# Patient Record
Sex: MALE | Race: WHITE | Employment: OTHER | URBAN - METROPOLITAN AREA
[De-identification: names, ages, dates, MRNs, and addresses within clinical notes are randomized per-mention and may not be internally consistent; named-entity substitution may affect disease eponyms.]

---

## 2018-08-15 ENCOUNTER — PRE VISIT (OUTPATIENT)
Dept: ONCOLOGY | Facility: CLINIC | Age: 73
End: 2018-08-15

## 2018-08-15 NOTE — TELEPHONE ENCOUNTER
Date of appointment:  8/21/2018   Diagnosis/reason for appointment:  MDS  Referring provider/facility: Dr. Calvin Lopez  Who called:  Referral    Recent Studies  Imaging: None  Pathology: [facility]  Records from [facility] and [facility]      Additional information:    August 15, 2018  Faxed STAT request to [facility] for records and Bx slides

## 2018-08-20 NOTE — TELEPHONE ENCOUNTER
August 20, 2018 11:25 AM  Re-faxed STAT request to [facility] for records and slide.  Also LVM to Follow-up with status.

## 2018-08-21 ENCOUNTER — ONCOLOGY VISIT (OUTPATIENT)
Dept: ONCOLOGY | Facility: CLINIC | Age: 73
End: 2018-08-21
Attending: INTERNAL MEDICINE
Payer: COMMERCIAL

## 2018-08-21 VITALS
WEIGHT: 218.3 LBS | TEMPERATURE: 98.6 F | SYSTOLIC BLOOD PRESSURE: 131 MMHG | HEIGHT: 71 IN | HEART RATE: 64 BPM | BODY MASS INDEX: 30.56 KG/M2 | OXYGEN SATURATION: 100 % | DIASTOLIC BLOOD PRESSURE: 67 MMHG

## 2018-08-21 DIAGNOSIS — C94.6 MDS/MPN (MYELODYSPLASTIC/MYELOPROLIFERATIVE NEOPLASMS) (H): Primary | ICD-10-CM

## 2018-08-21 PROCEDURE — 99205 OFFICE O/P NEW HI 60 MIN: CPT | Mod: ZP | Performed by: INTERNAL MEDICINE

## 2018-08-21 PROCEDURE — G0463 HOSPITAL OUTPT CLINIC VISIT: HCPCS | Mod: ZF

## 2018-08-21 RX ORDER — HYDROCHLOROTHIAZIDE 25 MG/1
25 TABLET ORAL
COMMUNITY
Start: 2013-04-24

## 2018-08-21 RX ORDER — IBUPROFEN 200 MG
CAPSULE ORAL
COMMUNITY

## 2018-08-21 RX ORDER — METOPROLOL TARTRATE 25 MG/1
25 TABLET, FILM COATED ORAL
COMMUNITY
Start: 2013-04-24

## 2018-08-21 RX ORDER — ASPIRIN 325 MG
325 TABLET ORAL
COMMUNITY
Start: 2013-04-24

## 2018-08-21 RX ORDER — LISINOPRIL 20 MG/1
TABLET ORAL
COMMUNITY
Start: 2018-07-09

## 2018-08-21 RX ORDER — TRIAMCINOLONE ACETONIDE 1 MG/G
OINTMENT TOPICAL
COMMUNITY

## 2018-08-21 RX ORDER — MECLIZINE HYDROCHLORIDE 25 MG/1
25 TABLET ORAL
COMMUNITY

## 2018-08-21 RX ORDER — HYDROXYUREA 500 MG/1
1000 CAPSULE ORAL
COMMUNITY

## 2018-08-21 ASSESSMENT — PAIN SCALES - GENERAL: PAINLEVEL: NO PAIN (0)

## 2018-08-21 NOTE — MR AVS SNAPSHOT
"              After Visit Summary   2018    Tucker Nava    MRN: [id number]           Patient Information     Date Of Birth          1945        Visit Information        Provider Department      2018 1:15 PM Kenneth Robles MD [facility]        Today's Diagnoses     MDS/MPN (myelodysplastic/myeloproliferative neoplasms) (H)    -  1       Follow-ups after your visit        Who to contact     If you have questions or need follow up information about today's clinic visit or your schedule please contact [facility] directly at [phone].  Normal or non-critical lab and imaging results will be communicated to you by MyChart, letter or phone within 4 business days after the clinic has received the results. If you do not hear from us within 7 days, please contact the clinic through [employer] or phone. If you have a critical or abnormal lab result, we will notify you by phone as soon as possible.  Submit refill requests through [employer] or call your pharmacy and they will forward the refill request to us. Please allow 3 business days for your refill to be completed.          Additional Information About Your Visit        MyChart Information     [employer] lets you send messages to your doctor, view your test results, renew your prescriptions, schedule appointments and more. To sign up, go to [email or web address] . Click on \"Log in\" on the left side of the screen, which will take you to the Welcome page. Then click on \"Sign up Now\" on the right side of the page.     You will be asked to enter the access code listed below, as well as some personal information. Please follow the directions to create your username and password.     Your access code is: [email or web address]  Expires: 2018  8:53 AM     Your access code will  in 90 days. If you need help or a new code, please call your [place] clinic or [phone].        Care EveryWhere ID     This is your Care " "EveryWhere ID. This could be used by other organizations to access your [facility] medical records  [id number]        Your Vitals Were     Pulse Temperature Height Pulse Oximetry BMI (Body Mass Index)       64 98.6  F (37  C) (Oral) 1.803 m (5' 11\") 100% 30.45 kg/m2        Blood Pressure from Last 3 Encounters:   08/21/18 131/67   04/24/13 (!) 164/108    Weight from Last 3 Encounters:   08/21/18 99 kg (218 lb 4.8 oz)   04/24/13 108 kg (238 lb)              Today, you had the following     No orders found for display       Primary Care Provider Office Phone # Fax #    Patel Burns [phone]       [facility] [address] [place] [address]        Equal Access to Services     [facility]: Hadii manuel rizvi Soshady, waaxda luqadaha, qaybta kaalmada phil, william espinoza . So [facility] [phone].    ATENCIÓN: Si habla español, tiene a leal disposición servicios gratuitos de asistencia lingüística. Nanci al [phone].    We comply with applicable federal civil rights laws and [place] laws. We do not discriminate on the basis of race, color, national origin, age, disability, sex, sexual orientation, or gender identity.            Thank you!     Thank you for choosing [facility]  for your care. Our goal is always to provide you with excellent care. Hearing back from our patients is one way we can continue to improve our services. Please take a few minutes to complete the written survey that you may receive in the mail after your visit with us. Thank you!             Your Updated Medication List - Protect others around you: Learn how to safely use, store and throw away your medicines at www.disposemymeds.org.          This list is accurate as of 8/21/18  8:32 PM.  Always use your most recent med list.                   Brand Name Dispense Instructions for use Diagnosis    aspirin 325 MG tablet      Take 325 mg by mouth        " Ca Carb-FA-D-B6-B12-Boron-Mg 1342-1 MG Wafr           calcium citrate 950 MG tablet    CALCITRATE          CITRUCEL Powd   Generic drug:  methylcellulose (laxative)           FISH OIL PO      Take 1,000 mg by mouth        hydrochlorothiazide 25 MG tablet    HYDRODIURIL     Take 25 mg by mouth        hydroxyurea 500 MG capsule CHEMO    HYDREA     Take 1,000 mg by mouth        lisinopril 20 MG tablet    PRINIVIL/ZESTRIL          meclizine 25 MG tablet    ANTIVERT     Take 25 mg by mouth        metoprolol tartrate 25 MG tablet    LOPRESSOR     Take 25 mg by mouth        triamcinolone 0.1 % ointment    KENALOG          vitamin D3 1000 units Caps      Take 1,000 Units by mouth

## 2018-08-21 NOTE — NURSING NOTE
"Oncology Rooming Note    August 21, 2018 1:03 PM   Tucker Nava is a 72 year old male who presents for:    Chief Complaint   Patient presents with     Oncology Clinic Visit     [facility]     Initial Vitals: /67 (BP Location: Right arm, Patient Position: Sitting)  Pulse 64  Temp 98.6  F (37  C) (Oral)  Ht 1.803 m (5' 11\")  Wt 99 kg (218 lb 4.8 oz)  SpO2 100%  BMI 30.45 kg/m2 Estimated body mass index is 30.45 kg/(m^2) as calculated from the following:    Height as of this encounter: 1.803 m (5' 11\").    Weight as of this encounter: 99 kg (218 lb 4.8 oz). Body surface area is 2.23 meters squared.  No Pain (0) Comment: Data Unavailable   No LMP for male patient.  Allergies reviewed: Yes  Medications reviewed: Yes    Medications: Medication refills not needed today.  Pharmacy name entered into EPIC: Data Unavailable    Clinical concerns: new     6 minutes for nursing intake (face to face time)     Kierra Hou RN              "

## 2018-08-21 NOTE — TELEPHONE ENCOUNTER
August 21, 2018 7:25 AM  Fax received from [facility] stating request for records has been received on 8/20/18

## 2018-08-21 NOTE — LETTER
8/21/2018       RE: Tucker Nava  [address] [place] [address]     Dear Colleague,    Thank you for referring your patient, Tucker Nava, to the [facility]. Please see a copy of my visit note below.    [facility]  HEMATOLOGY AND MEDICAL ONCOLOGY    CONSULTATION    PATIENT NAME: Tucker Nava   MRN# [id number]     Date of Visit: Aug 21, 2018    Referring Provider: Referred Self, MD  No address on file YOB: 1945     Reason for consult: Mr. 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     CHIEF COMPLAINT     Dyspnea on exertion     HISTORY OF PRESENTING ILLNESS     Mr. 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     There are no available records of prior treatments, blood counts or biopsy reports. Most of the history was obtained from the 8/2/2018 note of Dr. Blankenship from [facility] [place]    His oncologic history is as follows: He was noted to have elevated platelet counts dating before 2014. In 08/2014, the platelets were 526x10^3/uL, in 10/2016 696x10^3/uL and in 12/2018 848x10^3/uL. Per reports at that time he was seen by the Hematologist and CBC shown Hemoglobin 11.7g/dL, MCV 83fL and Neutrophil 65%. JAK2 mutation was positive and BCR - ABL was negative (reports and dates are missing). 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 Chromosome analysis was normal and there was no 5q deletion (reports and dates are missing). Abdominal U/S on 12/2016 showed Splenomegaly 16 x16 x 9cm (report is missing). At that point he was started on Hydrea 500 mg once daily which increased up to 1g four days a week and 1.5 g three days a week with subsequent decrease of platelet counts down to 400-500x10^3/uL. He developed worsening anemia since 02/2018 with Hemoglobin decline down to 9.5-10mg/dL. He was seen by his oncologist  on 08/02/2018 and had blood draw shown WBC 3.8×10^3/uL, neutrophils 65.7%,hemoglobin 9.6 g/dL, hematocrit 29.3%, .7 fL, platelets 422×10^3/uL, iron 85 mcg/dL, ferritin 360 ng/mL,  IU/L, creatinine 1.02 mg/dL, folate 19.3 ng/mL, B12 1401 pg/mL. Blood smear did not show any blasts.  His anemia was thought to be secondary to the Hydrea and there was a consideration to deescalate the Hydrea dose, should there is further decline in the hemoglobin.  Patient was also advised to repeat bone marrow biopsy to reassess for fibrosis and obtain an NGS panel. Finally, he was referred to [facility] to continue the treatment of his disease.    8/21/2018: He presents to clinic today for first appointment.  He denies any fevers chills or night sweats.  He reports fatigue and dyspnea on exertion with walking.  He denies any bruising or bleeding episodes.  He denies any pain.  He denies any prior history of thromboembolic episodes.  He denies any chest pain or palpitations.  He denies any recent weight loss. He has good appetite.         PAST MEDICAL HISTORY     Past Medical History:   Diagnosis Date     Benign neoplasm     12/07,History of tubular adenoma with low grade dysplasia, rectal polyp     Benign paroxysmal positional vertigo     No Comments Provided     Closed fracture of sacrum and coccyx (H)      08/24/2005,Sacral fracture involving sacrum and inferior SI joints, involving both right and left sides with displacement up to approximately 5 mm.  Does not extend to the SI joints.  CT scan 8/24/05.     Coronary atherosclerosis     02/2005,single hazy LAD lesion with unstable angina.  Coronary stenting to the LAD,  Dr. Cerna, 02/2005     Diverticulosis of colon     left     Essential hypertension     4/24/2013     Migraine     No Comments Provided     Osteoarthrosis, ankle and foot     1/5/2012     Other activity     Cardiology visit, Dr. Tracy, 3/22/05.  HSCRP 2.90, homocysteine 8, total cholesterol 150, triglycerides 87, HDL 34, LDL 99.  Started Lipitor 5 mg daily.     Personal history of disease     Normal LDL status.  At the time of hospitalization at [facility] 02/09/05, total cholesterol 95, HDL 25, LDL 49.  He is an ex-smoker, having quit ten years ago.  He does have borderline hypertension.     Personal history of tobacco use, presenting hazards to health     4/24/2013        PAST SURGICAL HISTORY     Past Surgical History:   Procedure Laterality Date     COLONOSCOPY      Colonoscopy revealing diminutive colon polyp and sigmoid diverticulosis/ diverticulitis, pathology revealed tubular adenoma.  Recommend repeat colonoscopy April 2007.     COLONOSCOPY      2007, 2013,F/U 2018     OTHER SURGICAL HISTORY      ,PTCA,Single vessel CAD with stent placement to LAD         CURRENT OUTPATIENT MEDICATIONS     Current Outpatient Prescriptions   Medication Sig     aspirin 325 MG tablet Take 325 mg by mouth     Cholecalciferol (VITAMIN D3) 1000 units CAPS Take 1,000 Units by mouth     hydrochlorothiazide (HYDRODIURIL) 25 MG tablet Take 25 mg by mouth     methylcellulose, laxative, (CITRUCEL) POWD      metoprolol tartrate (LOPRESSOR) 25 MG tablet Take 25 mg by mouth     Ca Carb-FA-D-B6-B12-Boron-Mg 1342-1 MG WAFR      calcium citrate (CALCITRATE) 950 MG tablet      hydroxyurea (HYDREA) 500 MG  capsule CHEMO Take 1,000 mg by mouth     lisinopril (PRINIVIL/ZESTRIL) 20 MG tablet      meclizine (ANTIVERT) 25 MG tablet Take 25 mg by mouth     Omega-3 Fatty Acids (FISH OIL PO) Take 1,000 mg by mouth     triamcinolone (KENALOG) 0.1 % ointment      No current facility-administered medications for this visit.         ALLERGIES   No known drug allergies     SOCIAL HISTORY     Social History     Social History     Marital status:      Spouse name: Aruna     Number of children: N/A     Years of education: N/A     Occupational History     Not on file.     Social History Main Topics     Smoking status: Former Smoker     Years: 20.00     Types: Cigarettes     Smokeless tobacco: Never Used      Comment: Quit smoking: Quit originally in , quit for 17 years, restarted in about , smoked again for about 5 years     Alcohol use Yes      Comment: Alcoholic Drinks/day: Couple drinks yearly     Drug use: Not on file      Comment: Drug use: No     Sexual activity: Not on file     Other Topics Concern     Not on file     Social History Narrative    Aruna Mother    Doris Daughter born 72    Vielka Daughter born 3/18/74    Eladio Son born 77    ; three children.  Retired from the [employer].     Wife - Becky Burr    [facility] including [facility].          FAMILY HISTORY     Family History   Problem Relation Age of Onset     HEART DISEASE Father      Heart Disease, age 76, coronary artery disease and myocardial infarctions,     Other - See Comments Father       Alzheimer's disease     Other - See Comments Father      Psychiatric illness,depression     Colon Cancer Mother      Cancer-colon, age 51 of colon cancer     Hypertension Sister      Hypertension     Family History Negative Brother      Good Health     Ovarian Cancer Daughter      Cancer-ovarian          REVIEW OF SYSTEMS   Pertinent positives have been included in HPI;  Review  Of Systems  General: Fatigue, as per HPI.  Skin: negative for rash  Eyes: negative for visual blurring  Ears/Nose/Throat: negative for hearing loss  Respiratory: As per hpi, negative for cough  Cardiovascular: As per HPI  Gastrointestinal: negative for nausea, vomiting and abdominal pain  Genitourinary: negative for nocturia and dysuria  Musculoskeletal: negative for muscular pain or bone pain  Neurologic: negative for migraine headaches  Psychiatric: negative for anxiety  Hematologic/Lymphatic/Immunologic: as per hpi  Endocrine: negative for thyroid disorder     PHYSICAL EXAM   B/P: 131/67, T: 98.6, P: 64, R: Data Unavailable  Wt Readings from Last 3 Encounters:   08/21/18 99 kg (218 lb 4.8 oz)   04/24/13 108 kg (238 lb)     General appearance: pleasant man, not in acute distress  Eyes, Ears, Nose, Throat & Mouth:pupils equal and reactive to light and accommodation, extraocular movements intact, no icterus or injection.  Pale conjunctivae. Oropharynx is clear.  Neck: supple, see hem  Respiratory: clear to auscultation bilaterally  Cardiovascular: regular, no murmurs, rubs, or gallops  Gastrointenstinal: soft, non-tender, non-distended, normal bowel sounds, no hepatosplenomegaly  Extremities: warm, well perfused, no edema  Neurologic: Alert and oriented to person, place and time, Cranial nerves 2-12 intact, intact sensation to light touch, muscle strength 5/5 in 4 extremities, reflexes +2   Skin: no rash  Hematologic/Lymph: no cervical, axillary or inguinal lymphadenopathy     LABORATORY AND IMAGING STUDIES     No labs or imaging studies were obtained during this appointment    ECOG PS: 1   ASSESSMENT AND RECOMMENDATIONS     Mr. Nava is a 71yo man with past medical history of MDS/MPN with ring sideroblasts and thrombocytosis (MDS/MPN-RS-T) and CAD s/p PCI who recently developed worsening anemia and was referred to [facility] for a second opinion. His main complaint is dyspnea on mild activity. He is currenly on hydrea,  ASA and folic acid. He has high risk MDS/MPN-RS-T based on his age>59yo and the JAK2 mutation.  Recent lab review notable for macrocytic anemia and mild elevated platelets. The thrombocytosis is possibly due to the MPN component whereas the anemia is likely multifactorial due to the MDS/MPN and also due to the hydrea. Current management strategies of MDS/MPN-RS-T are extrapolated by MDS-RA and MPN and thus the management is individualized to address each presenting problem (Donna FAJARDO et al, Am J Hematol 2017). We discussed that the goal of therapy is to prevent thrombotic events and hemorrhagic complications. We discussed about continuing the ASA for prevention of thrombotic complications. Although, the patient has not achieved partial or complete response by the response criteria for ET, there are no evidence-based data to recommend a target platelet count for patients receiving cytoreductive therapy and hence we would recommend continuing the same dose of hydrea. Other treatment options that could be considered in the future in the context of hydrea intolerance or failure include the use of lenalidomide. Notably, lenalidomide has demonstrated clinical activity in JAK2 (V617F) mutated refractory anemia with ring sideroblasts and thrombocytosis (Prakash H et al, Blood 2010). Also in the context of worsening anemia, would recommend to obtain EPO levels and consider erythropoiesis stimulating agents for EPO level <500 and lenalidomide for EPO levels>500. We also discussed about consideration to repeat bone marrow biopsy to evaluate for myelofibrosis and also for NGS studies including SF3BP1 mutations. Notably, in a recent study of refractory anemia with ring sideroblasts and thrombocytosis, SF3BP1 mutations were associated with increased risk of thrombosis (Donna CUETO et al, Leukemia 2016). We discussed about the curative role of bone marrow tranplantation and the availability of a bone marrow transplant service in our  institution. Finally we discussed about pursuing further work of his dyspnea with a pulmonary and cardiology physician for possible spirometry and TTE studies. He will discuss our recommendations with his primary oncologist.    MDS/MPN-RS-T  -continue ASA and hydrea  -consider obtaining a bone marrow biopsy to evaluate for  myelofibrosis   *consider NGS panel including SF3B1 mutation   -consider abdominal U/S to assess the spleen size  -consider completing work up of anemia including direct shaun and haptoglobin  -consider obtaining EPO levels    Dyspnea  -consider pulmonary consult for spirometry studies  -consider cardiology consult for cardiac echogram    Kenneth Robles MD   of Medicine  Division of Hematology, Oncology and Transplantation  [facility]             Again, thank you for allowing me to participate in the care of your patient.      Sincerely,    Kenneth Robles MD

## 2018-08-21 NOTE — PROGRESS NOTES
[facility] PHYSICIANS  HEMATOLOGY AND MEDICAL ONCOLOGY    CONSULTATION    PATIENT NAME: Tucker Nava   MRN# [id number]     Date of Visit: Aug 21, 2018    Referring Provider: Referred Self, MD  No address on file YOB: 1945     Reason for consult: Mr. 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     CHIEF COMPLAINT     Dyspnea on exertion     HISTORY OF PRESENTING ILLNESS     Mr. 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     There are no available records of prior treatments, blood counts or biopsy reports. Most of the history was obtained from the 8/2/2018 note of Dr. Blankenship from [facility] [place]    His oncologic history is as follows: He was noted to have elevated platelet counts dating before 2014. In 08/2014, the platelets were 526x10^3/uL, in 10/2016 696x10^3/uL and in 12/2018 848x10^3/uL. Per reports at that time he was seen by the Hematologist and CBC shown Hemoglobin 11.7g/dL, MCV 83fL and Neutrophil 65%. JAK2 mutation was positive and BCR - ABL was negative (reports and dates are missing). 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 Chromosome analysis was normal and there was no 5q deletion (reports and dates are missing). Abdominal U/S on 12/2016  showed Splenomegaly 16 x16 x 9cm (report is missing). At that point he was started on Hydrea 500 mg once daily which increased up to 1g four days a week and 1.5 g three days a week with subsequent decrease of platelet counts down to 400-500x10^3/uL. He developed worsening anemia since 02/2018 with Hemoglobin decline down to 9.5-10mg/dL. He was seen by his oncologist  on 08/02/2018 and had blood draw shown WBC 3.8×10^3/uL, neutrophils 65.7%,hemoglobin 9.6 g/dL, hematocrit 29.3%, .7 fL, platelets 422×10^3/uL, iron 85 mcg/dL, ferritin 360 ng/mL,  IU/L, creatinine 1.02 mg/dL, folate 19.3 ng/mL, B12 1401 pg/mL. Blood smear did not show any blasts.  His anemia was thought to be secondary to the Hydrea and there was a consideration to deescalate the Hydrea dose, should there is further decline in the hemoglobin.  Patient was also advised to repeat bone marrow biopsy to reassess for fibrosis and obtain an NGS panel. Finally, he was referred to [facility] to continue the treatment of his disease.    8/21/2018: He presents to clinic today for first appointment.  He denies any fevers chills or night sweats.  He reports fatigue and dyspnea on exertion with walking.  He denies any bruising or bleeding episodes.  He denies any pain.  He denies any prior history of thromboembolic episodes.  He denies any chest pain or palpitations.  He denies any recent weight loss. He has good appetite.         PAST MEDICAL HISTORY     Past Medical History:   Diagnosis Date     Benign neoplasm     12/07,History of tubular adenoma with low grade dysplasia, rectal polyp     Benign paroxysmal positional vertigo     No Comments Provided     Closed fracture of sacrum and coccyx (H)     08/24/2005,Sacral fracture involving sacrum and inferior SI joints, involving both right and left sides with displacement up to approximately 5 mm.  Does not extend to the SI joints.  CT scan 8/24/05.     Coronary atherosclerosis      02/2005,single hazy LAD lesion with unstable angina.  Coronary stenting to the LAD,  Dr. Cerna, 02/2005     Diverticulosis of colon     left     Essential hypertension     4/24/2013     Migraine     No Comments Provided     Osteoarthrosis, ankle and foot     1/5/2012     Other activity     Cardiology visit, Dr. Tracy, 3/22/05.  HSCRP 2.90, homocysteine 8, total cholesterol 150, triglycerides 87, HDL 34, LDL 99.  Started Lipitor 5 mg daily.     Personal history of disease     Normal LDL status.  At the time of hospitalization at [facility] 02/09/05, total cholesterol 95, HDL 25, LDL 49.  He is an ex-smoker, having quit ten years ago.  He does have borderline hypertension.     Personal history of tobacco use, presenting hazards to health     4/24/2013        PAST SURGICAL HISTORY     Past Surgical History:   Procedure Laterality Date     COLONOSCOPY      Colonoscopy revealing diminutive colon polyp and sigmoid diverticulosis/ diverticulitis, pathology revealed tubular adenoma.  Recommend repeat colonoscopy April 2007.     COLONOSCOPY      2007, 2013,F/U 2018     OTHER SURGICAL HISTORY      ,PTCA,Single vessel CAD with stent placement to LAD         CURRENT OUTPATIENT MEDICATIONS     Current Outpatient Prescriptions   Medication Sig     aspirin 325 MG tablet Take 325 mg by mouth     Cholecalciferol (VITAMIN D3) 1000 units CAPS Take 1,000 Units by mouth     hydrochlorothiazide (HYDRODIURIL) 25 MG tablet Take 25 mg by mouth     methylcellulose, laxative, (CITRUCEL) POWD      metoprolol tartrate (LOPRESSOR) 25 MG tablet Take 25 mg by mouth     Ca Carb-FA-D-B6-B12-Boron-Mg 1342-1 MG WAFR      calcium citrate (CALCITRATE) 950 MG tablet      hydroxyurea (HYDREA) 500 MG capsule CHEMO Take 1,000 mg by mouth     lisinopril (PRINIVIL/ZESTRIL) 20 MG tablet      meclizine (ANTIVERT) 25 MG tablet Take 25 mg by mouth     Omega-3 Fatty Acids (FISH OIL PO) Take 1,000 mg by mouth     triamcinolone (KENALOG) 0.1 %  ointment      No current facility-administered medications for this visit.         ALLERGIES   No known drug allergies     SOCIAL HISTORY     Social History     Social History     Marital status:      Spouse name: Aruna     Number of children: N/A     Years of education: N/A     Occupational History     Not on file.     Social History Main Topics     Smoking status: Former Smoker     Years: 20.00     Types: Cigarettes     Smokeless tobacco: Never Used      Comment: Quit smoking: Quit originally in , quit for 17 years, restarted in about , smoked again for about 5 years     Alcohol use Yes      Comment: Alcoholic Drinks/day: Couple drinks yearly     Drug use: Not on file      Comment: Drug use: No     Sexual activity: Not on file     Other Topics Concern     Not on file     Social History Narrative    Aruna Mother    Doris Daughter born 72    Vielka Daughter born 3/18/74    Eladio Son born 77    ; three children.  Retired from the [employer].     Wife - Becky Burr    [facility] including [facility].          FAMILY HISTORY     Family History   Problem Relation Age of Onset     HEART DISEASE Father      Heart Disease, age 76, coronary artery disease and myocardial infarctions,     Other - See Comments Father       Alzheimer's disease     Other - See Comments Father      Psychiatric illness,depression     Colon Cancer Mother      Cancer-colon, age 51 of colon cancer     Hypertension Sister      Hypertension     Family History Negative Brother      Good Health     Ovarian Cancer Daughter      Cancer-ovarian          REVIEW OF SYSTEMS   Pertinent positives have been included in HPI;  Review Of Systems  General: Fatigue, as per HPI.  Skin: negative for rash  Eyes: negative for visual blurring  Ears/Nose/Throat: negative for hearing loss  Respiratory: As per hpi, negative for cough  Cardiovascular: As per  HPI  Gastrointestinal: negative for nausea, vomiting and abdominal pain  Genitourinary: negative for nocturia and dysuria  Musculoskeletal: negative for muscular pain or bone pain  Neurologic: negative for migraine headaches  Psychiatric: negative for anxiety  Hematologic/Lymphatic/Immunologic: as per hpi  Endocrine: negative for thyroid disorder     PHYSICAL EXAM   B/P: 131/67, T: 98.6, P: 64, R: Data Unavailable  Wt Readings from Last 3 Encounters:   08/21/18 99 kg (218 lb 4.8 oz)   04/24/13 108 kg (238 lb)     General appearance: pleasant man, not in acute distress  Eyes, Ears, Nose, Throat & Mouth:pupils equal and reactive to light and accommodation, extraocular movements intact, no icterus or injection.  Pale conjunctivae. Oropharynx is clear.  Neck: supple, see hem  Respiratory: clear to auscultation bilaterally  Cardiovascular: regular, no murmurs, rubs, or gallops  Gastrointenstinal: soft, non-tender, non-distended, normal bowel sounds, no hepatosplenomegaly  Extremities: warm, well perfused, no edema  Neurologic: Alert and oriented to person, place and time, Cranial nerves 2-12 intact, intact sensation to light touch, muscle strength 5/5 in 4 extremities, reflexes +2   Skin: no rash  Hematologic/Lymph: no cervical, axillary or inguinal lymphadenopathy     LABORATORY AND IMAGING STUDIES     No labs or imaging studies were obtained during this appointment    ECOG PS: 1   ASSESSMENT AND RECOMMENDATIONS     Mr. Nava is a 71yo man with past medical history of MDS/MPN with ring sideroblasts and thrombocytosis (MDS/MPN-RS-T) and CAD s/p PCI who recently developed worsening anemia and was referred to [facility] for a second opinion. His main complaint is dyspnea on mild activity. He is currenly on hydrea, ASA and folic acid. He has high risk MDS/MPN-RS-T based on his age>61yo and the JAK2 mutation.  Recent lab review notable for macrocytic anemia and mild elevated platelets. The thrombocytosis is possibly due to the  MPN component whereas the anemia is likely multifactorial due to the MDS/MPN and also due to the hydrea. Current management strategies of MDS/MPN-RS-T are extrapolated by MDS-RA and MPN and thus the management is individualized to address each presenting problem (Donna FAJARDO et al, Am J Hematol 2017). We discussed that the goal of therapy is to prevent thrombotic events and hemorrhagic complications. We discussed about continuing the ASA for prevention of thrombotic complications. Although, the patient has not achieved partial or complete response by the response criteria for ET, there are no evidence-based data to recommend a target platelet count for patients receiving cytoreductive therapy and hence we would recommend continuing the same dose of hydrea. Other treatment options that could be considered in the future in the context of hydrea intolerance or failure include the use of lenalidomide. Notably, lenalidomide has demonstrated clinical activity in JAK2 (V617F) mutated refractory anemia with ring sideroblasts and thrombocytosis (Prakash H et al, Blood 2010). Also in the context of worsening anemia, would recommend to obtain EPO levels and consider erythropoiesis stimulating agents for EPO level <500 and lenalidomide for EPO levels>500. We also discussed about consideration to repeat bone marrow biopsy to evaluate for myelofibrosis and also for NGS studies including SF3BP1 mutations. Notably, in a recent study of refractory anemia with ring sideroblasts and thrombocytosis, SF3BP1 mutations were associated with increased risk of thrombosis (Donna MM et al, Leukemia 2016). We discussed about the curative role of bone marrow tranplantation and the availability of a bone marrow transplant service in our institution. Finally we discussed about pursuing further work of his dyspnea with a pulmonary and cardiology physician for possible spirometry and TTE studies. He will discuss our recommendations with his primary  oncologist.    MDS/MPN-RS-T  -continue ASA and hydrea  -consider obtaining a bone marrow biopsy to evaluate for  myelofibrosis   *consider NGS panel including SF3B1 mutation   -consider abdominal U/S to assess the spleen size  -consider completing work up of anemia including direct shaun and haptoglobin  -consider obtaining EPO levels    Dyspnea  -consider pulmonary consult for spirometry studies  -consider cardiology consult for cardiac echogram    Kenneth Robles MD   of Medicine  Division of Hematology, Oncology and Transplantation  [facility]

## 2018-08-22 NOTE — TELEPHONE ENCOUNTER
August 22, 2018 8:40 AM  Call to [facility] for status update on records. Stated they never received.  Confirmed as correct fax# and re-faxed request today.  Also re-faxed request for slides to new number.

## 2018-08-23 NOTE — TELEPHONE ENCOUNTER
August 23, 2018 8:43 AM  Call from [facility], they will be sending PT's slides on their  this morning at 9:00am

## 2018-08-27 PROCEDURE — 00000345 ZZHCL STATISTIC REV BONE MARROW OUTSIDE SLIDES TC 88321: Performed by: INTERNAL MEDICINE

## 2018-08-30 LAB — COPATH REPORT: NORMAL

## 2018-09-22 NOTE — TELEPHONE ENCOUNTER
September 22, 2018 8:47 AM  Confirmed Slides received from [facility] on 8/24/18